# Patient Record
Sex: MALE | Race: WHITE | Employment: PART TIME | URBAN - METROPOLITAN AREA
[De-identification: names, ages, dates, MRNs, and addresses within clinical notes are randomized per-mention and may not be internally consistent; named-entity substitution may affect disease eponyms.]

---

## 2017-03-13 ENCOUNTER — TELEPHONE (OUTPATIENT)
Dept: FAMILY MEDICINE | Facility: CLINIC | Age: 32
End: 2017-03-13

## 2017-03-13 DIAGNOSIS — H10.9 CONJUNCTIVITIS: Primary | ICD-10-CM

## 2017-03-13 RX ORDER — POLYMYXIN B SULFATE AND TRIMETHOPRIM 1; 10000 MG/ML; [USP'U]/ML
1 SOLUTION OPHTHALMIC 4 TIMES DAILY
Qty: 2 ML | Refills: 0 | Status: SHIPPED | OUTPATIENT
Start: 2017-03-13 | End: 2017-03-20

## 2017-03-13 NOTE — TELEPHONE ENCOUNTER
RN Conjunctivitis Protocol: Ages 2 and older  Dominick Ross is a 31 year old male is having symptoms reviewed for possible conjunctivitis.      ASSESSMENT/PLAN:  Allergy to Sulfa?  No   1.  Medication Indicated: YES - POLYTRIM 28741-8.1 UNIT/ML-% OP Sol, 1 drop in affected eye(s) 4 times daily while awake x 7 days. .    2.  Education regarding contact precautions, hand washing, avoid wearing contacts until finished with drops or until symptoms resolve, contact clinic if there is no improvement of symptoms within 3 days and if develops eye pain or sensitivity to light.   3.  Follow-up: Contact provider's triage RN if symptoms do not improve after 3 days of antibiotic treatment or if symptoms return after antibiotic therapy is complete.  4.  Patient verbalized understanding of this plan and is agreeable.    SUBJECTIVE:     Conjunctival symptoms: redness, mattering (yellow/green) and itching   Location: right eye  Onset: 1 day ago  In addition notes: None  Contact Lens use?: Yes; clean or dispose of current contacts, no contact use for 24 hrs from initiation of antibiotic therapy  Complicating factors    Reports:NONE  Denies:Vision changes; not cleared with blinking, Recent  history of eye trauma, Sensitivity to light, Severe eye pain, Fever: none, Eyelid symptoms None      OBJECTIVE:     Encounter handled by: Nurse Triage.     Praveena Owen RN

## 2018-02-28 ENCOUNTER — OFFICE VISIT (OUTPATIENT)
Dept: INTERNAL MEDICINE | Facility: CLINIC | Age: 33
End: 2018-02-28
Payer: COMMERCIAL

## 2018-02-28 VITALS
HEART RATE: 89 BPM | DIASTOLIC BLOOD PRESSURE: 74 MMHG | WEIGHT: 207.1 LBS | OXYGEN SATURATION: 97 % | BODY MASS INDEX: 28.48 KG/M2 | SYSTOLIC BLOOD PRESSURE: 124 MMHG | TEMPERATURE: 97.8 F

## 2018-02-28 DIAGNOSIS — J02.0 ACUTE STREPTOCOCCAL PHARYNGITIS: ICD-10-CM

## 2018-02-28 DIAGNOSIS — R07.0 THROAT PAIN: Primary | ICD-10-CM

## 2018-02-28 LAB
DEPRECATED S PYO AG THROAT QL EIA: ABNORMAL
SPECIMEN SOURCE: ABNORMAL

## 2018-02-28 PROCEDURE — 99213 OFFICE O/P EST LOW 20 MIN: CPT | Performed by: INTERNAL MEDICINE

## 2018-02-28 PROCEDURE — 87880 STREP A ASSAY W/OPTIC: CPT | Performed by: INTERNAL MEDICINE

## 2018-02-28 RX ORDER — PENICILLIN V POTASSIUM 500 MG/1
500 TABLET, FILM COATED ORAL 3 TIMES DAILY
Qty: 30 TABLET | Refills: 0 | Status: SHIPPED | OUTPATIENT
Start: 2018-02-28 | End: 2019-01-16

## 2018-02-28 ASSESSMENT — PAIN SCALES - GENERAL: PAINLEVEL: NO PAIN (0)

## 2018-02-28 NOTE — NURSING NOTE
"Chief Complaint   Patient presents with     Pharyngitis     Monday, last night worse       Initial /74  Pulse 89  Temp 97.8  F (36.6  C) (Tympanic)  Wt 207 lb 1.6 oz (93.9 kg)  SpO2 97%  BMI 28.48 kg/m2 Estimated body mass index is 28.48 kg/(m^2) as calculated from the following:    Height as of 5/7/15: 5' 11.5\" (1.816 m).    Weight as of this encounter: 207 lb 1.6 oz (93.9 kg).  Medication Reconciliation: complete  "

## 2018-02-28 NOTE — PROGRESS NOTES
SUBJECTIVE:   Dominick Ross is a 32 year old male who presents to clinic today for the following health issues:    Acute Illness   Acute illness concerns: sore throat   Onset: Monday     Fever: no    Chills/Sweats: YES    Headache (location?): no    Sinus Pressure:no    Conjunctivitis:  no    Ear Pain: no    Rhinorrhea: no    Congestion: YES    Sore Throat: YES     Cough: YES    Wheeze: no    Decreased Appetite: no    Nausea: no    Vomiting: no     Diarrhea:  no    Dysuria/Freq.: no    Fatigue/Achiness: YES    Sick/Strep Exposure: YES- wife had strep thraot     Therapies Tried and outcome: Tylenol and OTC cold medication  Wife had strep, now he has symptoms.    Was ok until last night.      Past Medical History:   Diagnosis Date     NO ACTIVE PROBLEMS      No current outpatient prescriptions on file.     No current facility-administered medications for this visit.      Social History   Substance Use Topics     Smoking status: Never Smoker     Smokeless tobacco: Current User     Types: Chew     Alcohol use Yes     Physical Exam  /74  Pulse 89  Temp 97.8  F (36.6  C) (Tympanic)  Wt 207 lb 1.6 oz (93.9 kg)  SpO2 97%  BMI 28.48 kg/m2  General Appearance-healthy, alert, no distress  ENT-tonsils red, enlarged, with exudate present  Cardiac-regular rate and rhythm  with normal S1, S2 ; no murmur, rub or gallops  Lungs-clear to auscultation    ASSESSMENT:  Patient with positive strep test, will treat with pcn for 10 days.  Warned about side effects.    Electronically signed by Delvis Alvarado MD

## 2018-02-28 NOTE — MR AVS SNAPSHOT
"              After Visit Summary   2018    Dominick Ross    MRN: [id number]           Patient Information     Date Of Birth          1985        Visit Information        Provider Department      2018 11:30 AM Delvis Alvarado MD [facility]         Follow-ups after your visit        Who to contact     If you have questions or need follow up information about today's clinic visit or your schedule please contact [facility] directly at [phone].  Normal or non-critical lab and imaging results will be communicated to you by [employer], letter or phone within 4 business days after the clinic has received the results. If you do not hear from us within 7 days, please contact the clinic through [employer] or phone. If you have a critical or abnormal lab result, we will notify you by phone as soon as possible.  Submit refill requests through [employer] or call your pharmacy and they will forward the refill request to us. Please allow 3 business days for your refill to be completed.          Additional Information About Your Visit        [employer] Information     [employer] lets you send messages to your doctor, view your test results, renew your prescriptions, schedule appointments and more. To sign up, go to [email or web address] . Click on \"Log in\" on the left side of the screen, which will take you to the Welcome page. Then click on \"Sign up Now\" on the right side of the page.     You will be asked to enter the access code listed below, as well as some personal information. Please follow the directions to create your username and password.     Your access code is: [email or web address]  Expires: 2018 11:33 AM     Your access code will  in 90 days. If you need help or a new code, please call your [facility] or [phone].        Care EveryWhere ID     This is your Care EveryWhere ID. This could be used by other organizations to access your [place] medical " records  [id number]        Your Vitals Were     Pulse Temperature Pulse Oximetry BMI (Body Mass Index)          89 97.8  F (36.6  C) (Tympanic) 97% 28.48 kg/m2         Blood Pressure from Last 3 Encounters:   02/28/18 124/74   09/03/15 121/85   08/06/15 110/68    Weight from Last 3 Encounters:   02/28/18 207 lb 1.6 oz (93.9 kg)   08/06/15 213 lb (96.6 kg)   05/07/15 208 lb 8 oz (94.6 kg)              Today, you had the following     No orders found for display       Primary Care Provider Office Phone # Fax #    Remy Rosas [phone]       [facility] [address] [place] [address]        Equal Access to Services     DANIELLA LIZ : Hadii aad ku hadasho Soomaali, waaxda luqadaha, qaybta kaalmada adeignacioyada, jorge ramírez . So [facility] [phone].    ATENCIÓN: Si habla español, tiene a thompson disposición servicios gratuitos de asistencia lingüística. Anurag al [phone].    We comply with applicable federal civil rights laws and [place] laws. We do not discriminate on the basis of race, color, national origin, age, disability, sex, sexual orientation, or gender identity.            Thank you!     Thank you for choosing [facility]  for your care. Our goal is always to provide you with excellent care. Hearing back from our patients is one way we can continue to improve our services. Please take a few minutes to complete the written survey that you may receive in the mail after your visit with us. Thank you!             Your Updated Medication List - Protect others around you: Learn how to safely use, store and throw away your medicines at www.disposemymeds.org.      Notice  As of 2/28/2018 11:33 AM    You have not been prescribed any medications.

## 2018-12-28 ENCOUNTER — TRANSFERRED RECORDS (OUTPATIENT)
Dept: HEALTH INFORMATION MANAGEMENT | Facility: CLINIC | Age: 33
End: 2018-12-28

## 2019-01-03 ENCOUNTER — TRANSFERRED RECORDS (OUTPATIENT)
Dept: HEALTH INFORMATION MANAGEMENT | Facility: CLINIC | Age: 34
End: 2019-01-03

## 2019-01-08 ENCOUNTER — TRANSFERRED RECORDS (OUTPATIENT)
Dept: HEALTH INFORMATION MANAGEMENT | Facility: CLINIC | Age: 34
End: 2019-01-08

## 2019-01-08 ENCOUNTER — TELEPHONE (OUTPATIENT)
Dept: FAMILY MEDICINE | Facility: CLINIC | Age: 34
End: 2019-01-08

## 2019-01-08 NOTE — TELEPHONE ENCOUNTER
Patient called to schedule an appointment for a hospital follow-up or appeared on a report showing that they were recently discharged from the hospital.    Patient was admitted to [facility]:  01/03/2019  Discharged date: 01/09/2019  Reason for hospital admission:  Spinal cord injury  Does patient have future appointment scheduled with provider? Yes Dr Rosas  Date of future appointment:  01/16/2019      This information will be used to help the care team plan for the patients upcoming visit.  The triage RN may determine that a follow up call is necessary and reach out to the patient via the phone number listed in the chart.     Please route this message on routine priority to the Triage RN pool.

## 2019-01-10 NOTE — TELEPHONE ENCOUNTER
ED / Discharge Outreach Protocol    Patient Contact    Attempt # 1    Was call answered?  No.  Left message on voicemail with information to call me back.    Raysa Back RN

## 2019-01-10 NOTE — TELEPHONE ENCOUNTER
"Hospital/TCU/ED for chronic condition Discharge Protocol    \"Hi, my name is Raysa Back, a registered nurse, and I am calling from [facility].  I am calling to follow up and see how things are going for you after your recent emergency visit/hospital/TCU stay.\"    Tell me how you are doing now that you are home?\" I'm doing ok.      Discharge Instructions    \"Let's review your discharge instructions.  What is/are the follow-up recommendations?  Pt. Response: Activity as tolerated, regular diet, get PT set up, and take my meds. I can't go back to work yet.     \"Has an appointment with your primary care provider been scheduled?\"   Yes. (confirm)    \"When you see the provider, I would recommend that you bring your medications with you.\"    Medications    \"Tell me what changed about your medicines when you discharged?\"    Changes to chronic meds?    2 or more - Epic MTM referral needed      \"What questions do you have about your medications?\"    None     New diagnoses of heart failure, COPD, diabetes, or MI?    No       Medication reconciliation completed? Attempted, but patient unable to complete on phone:   He reports he is now taking Oxycodone, Gabapentin, Tylenol, Ibuprofen, Senacot, and Robaxin  Was MTM referral placed (*Make sure to put transitions as reason for referral)?   No    Call Summary    \"What questions or concerns do you have about your recent visit and your follow-up care?\"     none    \"If you have questions or things don't continue to improve, we encourage you contact us through the main clinic number (give number).  Even if the clinic is not open, triage nurses are available 24/7 to help you.     We would like you to know that our clinic has extended hours (provide information).  We also have urgent care (provide details on closest location and hours/contact info)\"      \"Thank you for your time and take care!\"       Raysa Back RN         "

## 2019-01-14 ENCOUNTER — HOSPITAL ENCOUNTER (OUTPATIENT)
Dept: PHYSICAL THERAPY | Facility: CLINIC | Age: 34
Setting detail: THERAPIES SERIES
End: 2019-01-14
Attending: PSYCHIATRY & NEUROLOGY
Payer: OTHER MISCELLANEOUS

## 2019-01-14 PROCEDURE — 97162 PT EVAL MOD COMPLEX 30 MIN: CPT | Mod: GP | Performed by: PHYSICAL THERAPIST

## 2019-01-14 PROCEDURE — 97530 THERAPEUTIC ACTIVITIES: CPT | Mod: GP | Performed by: PHYSICAL THERAPIST
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
## 2019-01-16 ENCOUNTER — OFFICE VISIT (OUTPATIENT)
Dept: BEHAVIORAL HEALTH | Facility: CLINIC | Age: 34
End: 2019-01-16
Payer: COMMERCIAL

## 2019-01-16 ENCOUNTER — OFFICE VISIT (OUTPATIENT)
Dept: FAMILY MEDICINE | Facility: CLINIC | Age: 34
End: 2019-01-16
Payer: OTHER MISCELLANEOUS

## 2019-01-16 ENCOUNTER — HOSPITAL ENCOUNTER (OUTPATIENT)
Dept: PHYSICAL THERAPY | Facility: CLINIC | Age: 34
Setting detail: THERAPIES SERIES
End: 2019-01-16
Attending: PSYCHIATRY & NEUROLOGY
Payer: OTHER MISCELLANEOUS

## 2019-01-16 VITALS
SYSTOLIC BLOOD PRESSURE: 122 MMHG | RESPIRATION RATE: 16 BRPM | BODY MASS INDEX: 27.66 KG/M2 | TEMPERATURE: 98.1 F | OXYGEN SATURATION: 96 % | HEART RATE: 91 BPM | WEIGHT: 201.1 LBS | DIASTOLIC BLOOD PRESSURE: 58 MMHG

## 2019-01-16 DIAGNOSIS — S22.31XD CLOSED FRACTURE OF ONE RIB OF RIGHT SIDE WITH ROUTINE HEALING, SUBSEQUENT ENCOUNTER: ICD-10-CM

## 2019-01-16 DIAGNOSIS — F43.22 ADJUSTMENT DISORDER WITH ANXIOUS MOOD: Primary | ICD-10-CM

## 2019-01-16 DIAGNOSIS — F43.10 PTSD (POST-TRAUMATIC STRESS DISORDER): ICD-10-CM

## 2019-01-16 DIAGNOSIS — S22.008D CLOSED FRACTURE OF SPINOUS PROCESS OF THORACIC VERTEBRA WITH ROUTINE HEALING, SUBSEQUENT ENCOUNTER: ICD-10-CM

## 2019-01-16 DIAGNOSIS — S22.080S: Primary | ICD-10-CM

## 2019-01-16 PROCEDURE — 99215 OFFICE O/P EST HI 40 MIN: CPT | Performed by: FAMILY MEDICINE

## 2019-01-16 PROCEDURE — 97110 THERAPEUTIC EXERCISES: CPT | Mod: GP | Performed by: PHYSICAL THERAPIST

## 2019-01-16 PROCEDURE — 97112 NEUROMUSCULAR REEDUCATION: CPT | Mod: GP | Performed by: PHYSICAL THERAPIST

## 2019-01-16 RX ORDER — GABAPENTIN 300 MG/1
300 CAPSULE ORAL 2 TIMES DAILY
COMMUNITY
End: 2019-02-11

## 2019-01-16 RX ORDER — TRAZODONE HYDROCHLORIDE 50 MG/1
50 TABLET, FILM COATED ORAL AT BEDTIME
COMMUNITY

## 2019-01-16 RX ORDER — METHOCARBAMOL 500 MG/1
500 TABLET, FILM COATED ORAL 4 TIMES DAILY
COMMUNITY

## 2019-01-16 RX ORDER — ACETAMINOPHEN 500 MG
500 TABLET ORAL
COMMUNITY

## 2019-01-16 RX ORDER — OXYCODONE HCL 5 MG/5 ML
5 SOLUTION, ORAL ORAL EVERY 4 HOURS PRN
COMMUNITY

## 2019-01-16 RX ORDER — POLYETHYLENE GLYCOL 3350 17 G/17G
1 POWDER, FOR SOLUTION ORAL DAILY
COMMUNITY

## 2019-01-16 RX ORDER — AMOXICILLIN 250 MG
1-2 CAPSULE ORAL 2 TIMES DAILY PRN
COMMUNITY

## 2019-01-16 RX ORDER — IBUPROFEN 400 MG/1
400 TABLET, FILM COATED ORAL EVERY 4 HOURS PRN
COMMUNITY
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
## 2019-01-18 NOTE — ADDENDUM NOTE
Encounter addended by: Millie Manriquez, PT on: 1/18/2019 9:50 AM   Actions taken: Flowsheet accepted

## 2019-01-20 PROBLEM — S22.31XD CLOSED FRACTURE OF ONE RIB OF RIGHT SIDE WITH ROUTINE HEALING, SUBSEQUENT ENCOUNTER: Status: ACTIVE | Noted: 2019-01-20

## 2019-01-20 PROBLEM — S22.008D: Status: ACTIVE | Noted: 2019-01-20

## 2019-01-20 PROBLEM — S22.080S: Status: ACTIVE | Noted: 2019-01-20

## 2019-01-20 PROBLEM — F43.10 PTSD (POST-TRAUMATIC STRESS DISORDER): Status: ACTIVE | Noted: 2019-01-20

## 2019-01-22 ENCOUNTER — OFFICE VISIT (OUTPATIENT)
Dept: BEHAVIORAL HEALTH | Facility: CLINIC | Age: 34
End: 2019-01-22
Payer: OTHER MISCELLANEOUS

## 2019-01-22 DIAGNOSIS — F43.23 ADJUSTMENT DISORDER WITH MIXED ANXIETY AND DEPRESSED MOOD: Primary | ICD-10-CM

## 2019-01-22 PROCEDURE — 90791 PSYCH DIAGNOSTIC EVALUATION: CPT | Performed by: MARRIAGE & FAMILY THERAPIST

## 2019-01-22 ASSESSMENT — ANXIETY QUESTIONNAIRES
IF YOU CHECKED OFF ANY PROBLEMS ON THIS QUESTIONNAIRE, HOW DIFFICULT HAVE THESE PROBLEMS MADE IT FOR YOU TO DO YOUR WORK, TAKE CARE OF THINGS AT HOME, OR GET ALONG WITH OTHER PEOPLE: VERY DIFFICULT
7. FEELING AFRAID AS IF SOMETHING AWFUL MIGHT HAPPEN: NEARLY EVERY DAY
1. FEELING NERVOUS, ANXIOUS, OR ON EDGE: SEVERAL DAYS
GAD7 TOTAL SCORE: 11
3. WORRYING TOO MUCH ABOUT DIFFERENT THINGS: MORE THAN HALF THE DAYS
5. BEING SO RESTLESS THAT IT IS HARD TO SIT STILL: SEVERAL DAYS
2. NOT BEING ABLE TO STOP OR CONTROL WORRYING: SEVERAL DAYS
6. BECOMING EASILY ANNOYED OR IRRITABLE: MORE THAN HALF THE DAYS

## 2019-01-22 ASSESSMENT — PATIENT HEALTH QUESTIONNAIRE - PHQ9
5. POOR APPETITE OR OVEREATING: SEVERAL DAYS
SUM OF ALL RESPONSES TO PHQ QUESTIONS 1-9: 5
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
## 2019-01-23 ENCOUNTER — HOSPITAL ENCOUNTER (OUTPATIENT)
Dept: PHYSICAL THERAPY | Facility: CLINIC | Age: 34
Setting detail: THERAPIES SERIES
End: 2019-01-23
Attending: PSYCHIATRY & NEUROLOGY
Payer: OTHER MISCELLANEOUS

## 2019-01-23 PROCEDURE — 97110 THERAPEUTIC EXERCISES: CPT | Mod: GP | Performed by: PHYSICAL THERAPIST

## 2019-01-23 PROCEDURE — 97112 NEUROMUSCULAR REEDUCATION: CPT | Mod: GP | Performed by: PHYSICAL THERAPIST

## 2019-01-23 ASSESSMENT — ANXIETY QUESTIONNAIRES: GAD7 TOTAL SCORE: 11

## 2019-01-24 ENCOUNTER — HOSPITAL ENCOUNTER (OUTPATIENT)
Dept: PHYSICAL THERAPY | Facility: CLINIC | Age: 34
Setting detail: THERAPIES SERIES
End: 2019-01-24
Attending: PSYCHIATRY & NEUROLOGY
Payer: OTHER MISCELLANEOUS

## 2019-01-24 PROCEDURE — 97110 THERAPEUTIC EXERCISES: CPT | Mod: GP | Performed by: PHYSICAL THERAPIST

## 2019-01-24 PROCEDURE — 97112 NEUROMUSCULAR REEDUCATION: CPT | Mod: GP | Performed by: PHYSICAL THERAPIST

## 2019-01-28 ENCOUNTER — HOSPITAL ENCOUNTER (OUTPATIENT)
Dept: PHYSICAL THERAPY | Facility: CLINIC | Age: 34
Setting detail: THERAPIES SERIES
End: 2019-01-28
Attending: PSYCHIATRY & NEUROLOGY
Payer: OTHER MISCELLANEOUS

## 2019-01-28 PROCEDURE — 97110 THERAPEUTIC EXERCISES: CPT | Mod: GP | Performed by: PHYSICAL THERAPIST

## 2019-01-28 PROCEDURE — 97112 NEUROMUSCULAR REEDUCATION: CPT | Mod: GP | Performed by: PHYSICAL THERAPIST

## 2019-01-30 ENCOUNTER — HOSPITAL ENCOUNTER (OUTPATIENT)
Dept: PHYSICAL THERAPY | Facility: CLINIC | Age: 34
Setting detail: THERAPIES SERIES
End: 2019-01-30
Attending: PSYCHIATRY & NEUROLOGY
Payer: OTHER MISCELLANEOUS

## 2019-01-30 PROCEDURE — 97112 NEUROMUSCULAR REEDUCATION: CPT | Mod: GP | Performed by: PHYSICAL THERAPIST

## 2019-01-30 PROCEDURE — 97110 THERAPEUTIC EXERCISES: CPT | Mod: GP | Performed by: PHYSICAL THERAPIST

## 2019-01-31 ENCOUNTER — OFFICE VISIT (OUTPATIENT)
Dept: FAMILY MEDICINE | Facility: CLINIC | Age: 34
End: 2019-01-31
Payer: OTHER MISCELLANEOUS

## 2019-01-31 ENCOUNTER — HOSPITAL ENCOUNTER (OUTPATIENT)
Dept: CARDIOLOGY | Facility: CLINIC | Age: 34
Discharge: HOME | End: 2019-01-31
Attending: FAMILY MEDICINE | Admitting: FAMILY MEDICINE
Payer: COMMERCIAL

## 2019-01-31 VITALS
OXYGEN SATURATION: 97 % | WEIGHT: 206.6 LBS | RESPIRATION RATE: 12 BRPM | BODY MASS INDEX: 28.41 KG/M2 | SYSTOLIC BLOOD PRESSURE: 106 MMHG | TEMPERATURE: 97.6 F | HEART RATE: 90 BPM | DIASTOLIC BLOOD PRESSURE: 58 MMHG

## 2019-01-31 DIAGNOSIS — R00.2 PALPITATIONS: Primary | ICD-10-CM

## 2019-01-31 DIAGNOSIS — S22.080S: ICD-10-CM

## 2019-01-31 DIAGNOSIS — R00.2 PALPITATIONS: ICD-10-CM

## 2019-01-31 PROCEDURE — 93000 ELECTROCARDIOGRAM COMPLETE: CPT | Performed by: FAMILY MEDICINE

## 2019-01-31 PROCEDURE — 93270 REMOTE 30 DAY ECG REV/REPORT: CPT

## 2019-01-31 PROCEDURE — 93272 ECG/REVIEW INTERPRET ONLY: CPT | Performed by: INTERNAL MEDICINE

## 2019-01-31 PROCEDURE — 99214 OFFICE O/P EST MOD 30 MIN: CPT | Performed by: FAMILY MEDICINE
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
## 2019-02-04 ENCOUNTER — HOSPITAL ENCOUNTER (OUTPATIENT)
Dept: PHYSICAL THERAPY | Facility: CLINIC | Age: 34
Setting detail: THERAPIES SERIES
End: 2019-02-04
Attending: PSYCHIATRY & NEUROLOGY
Payer: OTHER MISCELLANEOUS

## 2019-02-04 ENCOUNTER — OFFICE VISIT (OUTPATIENT)
Dept: BEHAVIORAL HEALTH | Facility: CLINIC | Age: 34
End: 2019-02-04
Payer: OTHER MISCELLANEOUS

## 2019-02-04 DIAGNOSIS — F43.23 ADJUSTMENT DISORDER WITH MIXED ANXIETY AND DEPRESSED MOOD: Primary | ICD-10-CM

## 2019-02-04 PROCEDURE — 97110 THERAPEUTIC EXERCISES: CPT | Mod: GP | Performed by: PHYSICAL THERAPIST

## 2019-02-04 PROCEDURE — 90834 PSYTX W PT 45 MINUTES: CPT | Performed by: MARRIAGE & FAMILY THERAPIST
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
## 2019-02-06 ENCOUNTER — TRANSFERRED RECORDS (OUTPATIENT)
Dept: HEALTH INFORMATION MANAGEMENT | Facility: CLINIC | Age: 34
End: 2019-02-06

## 2019-02-08 ENCOUNTER — HOSPITAL ENCOUNTER (OUTPATIENT)
Dept: PHYSICAL THERAPY | Facility: CLINIC | Age: 34
Setting detail: THERAPIES SERIES
End: 2019-02-08
Attending: PSYCHIATRY & NEUROLOGY
Payer: OTHER MISCELLANEOUS

## 2019-02-08 DIAGNOSIS — S22.080S: ICD-10-CM

## 2019-02-08 DIAGNOSIS — S22.31XD CLOSED FRACTURE OF ONE RIB OF RIGHT SIDE WITH ROUTINE HEALING, SUBSEQUENT ENCOUNTER: Primary | ICD-10-CM

## 2019-02-08 PROCEDURE — 97110 THERAPEUTIC EXERCISES: CPT | Mod: GP | Performed by: PHYSICAL THERAPIST

## 2019-02-08 PROCEDURE — 97112 NEUROMUSCULAR REEDUCATION: CPT | Mod: GP | Performed by: PHYSICAL THERAPIST
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
## 2019-02-11 ENCOUNTER — HOSPITAL ENCOUNTER (OUTPATIENT)
Dept: PHYSICAL THERAPY | Facility: CLINIC | Age: 34
Setting detail: THERAPIES SERIES
End: 2019-02-11
Attending: PSYCHIATRY & NEUROLOGY
Payer: OTHER MISCELLANEOUS

## 2019-02-11 PROCEDURE — 97110 THERAPEUTIC EXERCISES: CPT | Mod: GP | Performed by: PHYSICAL THERAPIST
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
## 2019-02-13 ENCOUNTER — HOSPITAL ENCOUNTER (OUTPATIENT)
Dept: PHYSICAL THERAPY | Facility: CLINIC | Age: 34
Setting detail: THERAPIES SERIES
End: 2019-02-13
Attending: PSYCHIATRY & NEUROLOGY
Payer: OTHER MISCELLANEOUS

## 2019-02-13 PROCEDURE — 97112 NEUROMUSCULAR REEDUCATION: CPT | Mod: GP | Performed by: PHYSICAL THERAPIST

## 2019-02-13 PROCEDURE — 97110 THERAPEUTIC EXERCISES: CPT | Mod: GP | Performed by: PHYSICAL THERAPIST

## 2019-02-13 PROCEDURE — 97116 GAIT TRAINING THERAPY: CPT | Mod: GP | Performed by: PHYSICAL THERAPIST

## 2019-02-13 RX ORDER — GABAPENTIN 300 MG/1
300 CAPSULE ORAL 2 TIMES DAILY
Qty: 60 CAPSULE | Refills: 1 | Status: SHIPPED | OUTPATIENT
Start: 2019-02-13

## 2019-02-14 ENCOUNTER — OFFICE VISIT (OUTPATIENT)
Dept: BEHAVIORAL HEALTH | Facility: CLINIC | Age: 34
End: 2019-02-14
Payer: OTHER MISCELLANEOUS

## 2019-02-14 DIAGNOSIS — F43.23 ADJUSTMENT DISORDER WITH MIXED ANXIETY AND DEPRESSED MOOD: Primary | ICD-10-CM

## 2019-02-14 PROCEDURE — 90832 PSYTX W PT 30 MINUTES: CPT | Performed by: MARRIAGE & FAMILY THERAPIST
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
## 2019-02-18 ENCOUNTER — OFFICE VISIT (OUTPATIENT)
Dept: BEHAVIORAL HEALTH | Facility: CLINIC | Age: 34
End: 2019-02-18
Payer: OTHER MISCELLANEOUS

## 2019-02-18 ENCOUNTER — TRANSFERRED RECORDS (OUTPATIENT)
Dept: HEALTH INFORMATION MANAGEMENT | Facility: CLINIC | Age: 34
End: 2019-02-18

## 2019-02-18 DIAGNOSIS — F43.23 ADJUSTMENT DISORDER WITH MIXED ANXIETY AND DEPRESSED MOOD: Primary | ICD-10-CM

## 2019-02-18 PROCEDURE — 90832 PSYTX W PT 30 MINUTES: CPT | Performed by: MARRIAGE & FAMILY THERAPIST

## 2019-02-18 NOTE — RESULT ENCOUNTER NOTE
Cardiac monitor just showed sinus rhythm.  On one event when it was marked as feeling dizzy it was just a sinus tachycardia of 115.

## 2019-02-18 NOTE — PROGRESS NOTES
[facility]  February 18, 2019      Behavioral Health Clinician Progress Note    Patient Name: Dominick Ross           Service Type:  Individual      Service Location:   Face to Face in Clinic     Session Start Time: 9:40  Session End Time: 10:05      Session Length: 16 - 37      Attendees: Patient    Visit Activities (Refresh list every visit): [facility] Only    Diagnostic Assessment Date: 1/22/19  Treatment Plan Review Date: 2/14/2019  See Flowsheets for today's PHQ-9 and CANDY-7 results  Previous PHQ-9:   PHQ-9 SCORE 1/22/2019   PHQ-9 Total Score 5     Previous CANDY-7:   CANDY-7 SCORE 1/22/2019   Total Score 11       YENI LEVEL:  No flowsheet data found.    DATA  Extended Session (60+ minutes): No  Interactive Complexity: No  Crisis: No  [facility] Patient: No    Treatment Objective(s) Addressed in This Session:  Target Behavior(s): disease management/lifestyle changes anxiety and depression related to work accident and resulting medical issues    Depressed Mood: Decrease frequency and intensity of feeling down, depressed, hopeless  Feel less tired and more energy during the day   Identify negative self-talk and behaviors: challenge core beliefs, myths, and actions  Improve concentration, focus, and mindfulness in daily activities   Anxiety: will experience a reduction in anxiety, will develop more effective coping skills to manage anxiety symptoms, will develop healthy cognitive patterns and beliefs and will increase ability to function adaptively  Adjustment Difficulties: will develop coping/problem-solving skills to facilitate more adaptive adjustment  Relationship Problems: will address relationship difficulties in a more adaptive manner    Current Stressors / Issues:  Patient reports that he has been doing pretty well. He denies having any episodes where he feels difficulty taking a deep breath. He has been taking time to relax and breathe.   Patient has been talking with his spouse about  "possibly shadowing his dad for work to see what his dad's job is like. Patient wonders what it will be like to return to work as he has difficulty seeing any \"light\" duties in his role.   Patient processed how his accident has shifted his view in life and how he prioritizes things.      Progress on Treatment Objective(s) / Homework:  Minimal progress - ACTION (Actively working towards change); Intervened by reinforcing change plan / affirming steps taken    Motivational Interviewing    MI Intervention: Expressed Empathy/Understanding, Supported Autonomy, Collaboration, Evocation, Permission to raise concern or advise, Open-ended questions, Reflections: simple and complex, Change talk (evoked) and Reframe     Change Talk Expressed by the Patient: Desire to change Ability to change Reasons to change Need to change Committment to change Activation Taking steps    Provider Response to Change Talk: E - Evoked more info from patient about behavior change, A - Affirmed patient's thoughts, decisions, or attempts at behavior change, R - Reflected patient's change talk and S - Summarized patient's change talk statements    Also provided psychoeducation about behavioral health condition, symptoms, and treatment options    Skills training    Explored skills useful to client in current situation    Skills include assertiveness, communication, conflict management, problem-solving, relaxation, etc.    Solution-Focused Therapy    Explored patterns in patient's relationships and discussed options for new behaviors    Explored patterns in patient's actions and choices and discussed options for new behaviors    Cognitive-behavioral Therapy    Discussed common cognitive distortions, identified them in patient's life    Explored ways to challenge, replace, and act against these cognitions    Acceptance and Commitment Therapy    Explored and identified important values in patient's life    Discussed ways to commit to behavioral " activation around these values    Psychodynamic psychotherapy    Discussed patient's emotional dynamics and issues and how they impact behaviors    Explored patient's history of relationships and how they impact present behaviors    Explored how to work with and make changes in these schemas and patterns    Behavioral Activation    Discussed steps patient can take to become more involved in meaningful activity    Identified barriers to these activities and explored possible solutions    Mindfulness-Based Strategies    Discussed skills based on development and application of mindfulness    Skills drawn from dialectical behavior therapy, mindfulness-based stress reduction, mindfulness-based cognitive therapy, etc.      Care Plan review completed: Yes    Medication Review:  No current psychiatric medications prescribed    Medication Compliance:  NA    Changes in Health Issues:   Yes: Pain, some associated psychological distress as he states that this was different from before    Chemical Use Review:   Substance Use: Chemical use reviewed, no active concerns identified      Tobacco Use: No current tobacco use.      Assessment: Current Emotional / Mental Status (status of significant symptoms):  Risk status (Self / Other harm or suicidal ideation)  Patient has had a history of suicidal ideation: about a year ago he states that he had thoughts where he wondered if he be better off not around, he denied having any plan or intention and this happened when there was increased conflict with spouse  and denies a history of suicide attempts, self-injurious behavior, homicidal ideation, homicidal behavior and and other safety concerns  Patient denies current fears or concerns for personal safety.  Patient denies current or recent suicidal ideation or behaviors.  Patient denies current or recent homicidal ideation or behaviors.  Patient denies current or recent self injurious behavior or ideation.  Patient denies other safety  concerns.  A safety and risk management plan has not been developed at this time, however patient was encouraged to call [place] [phone] should there be a change in any of these risk factors.    Appearance:   Appropriate   Eye Contact:   Good   Psychomotor Behavior: Normal   Attitude:   Cooperative   Orientation:   All  Speech   Rate / Production: Normal    Volume:  Normal   Mood:    Normal  Affect:    Appropriate   Thought Content:  Clear   Thought Form:  Coherent  Logical   Insight:    Good     Diagnoses:  1. Adjustment disorder with mixed anxiety and depressed mood        Collateral Reports Completed:  Not Applicable    Plan: (Homework, other):  Patient was given information about behavioral services and encouraged to schedule a follow up appointment with the clinic [facility] in 2 weeks.  He was also given information about mental health symptoms and treatment options , Cognitive Behavioral Therapy skills to practice when experiencing anxiety and depression and deep Breathing Strategies to practice when experiencing anxiety and depression.  CD Recommendations: No indications of CD issues. Patient will monitor pain and triggers for increased anxiety. He will use coping statements and deep breathing to help him manage symptoms. GEMA Gomez, [facility]        ______________________________________________________________________    Integrated Primary Care Behavioral Health Treatment Plan    Patient's Name: Dominick Ross  YOB: 1985    Date: February 14, 2019    DSM-V Diagnoses: Adjustment Disorders  309.28 (F43.23) With mixed anxiety and depressed mood   Rule out PTSD  Psychosocial / Contextual Factors: work accident/medical injury  WHODAS: 32    Referral / Collaboration:  Referral to another professional/service is not indicated at this time..    Anticipated number of session or this episode of care: 8      MeasurableTreatment Goal(s) related to diagnosis / functional impairment(s)  Goal 1:  Patient will effectively reduce anxiety symptoms as evidenced by a reduced PHQ9 & GAD7 scores of 5 or less with the occurrence of several days or less as it relates to his medical issues and work accident.      Objective #A (Patient Action)    Patient will talk about and process his experience of his work accident and changes afterwards  Status: New - Date: 2/14/19     Intervention(s)  [facility] will facilitate conversations about patient's experience of his work accident and healing process. .    Objective #B  Patient will increase coping strategies by 4  to more effectively manage current stressors  Status: New - Date: 2/14/19     Intervention(s)  [facility] will teach relaxation and mindfulness skills. Help patient identify coping statements and encourage communication with spouse.    Patient has reviewed and agreed to the above plan.    Written by  GEMA Gomez, [facility]

## 2019-02-19 ENCOUNTER — HOSPITAL ENCOUNTER (OUTPATIENT)
Dept: PHYSICAL THERAPY | Facility: CLINIC | Age: 34
Setting detail: THERAPIES SERIES
End: 2019-02-19
Attending: PSYCHIATRY & NEUROLOGY
Payer: OTHER MISCELLANEOUS

## 2019-02-19 PROCEDURE — 97110 THERAPEUTIC EXERCISES: CPT | Mod: GP | Performed by: PHYSICAL THERAPIST

## 2019-02-25 ENCOUNTER — HOSPITAL ENCOUNTER (OUTPATIENT)
Dept: PHYSICAL THERAPY | Facility: CLINIC | Age: 34
Setting detail: THERAPIES SERIES
End: 2019-02-25
Attending: PSYCHIATRY & NEUROLOGY
Payer: OTHER MISCELLANEOUS

## 2019-02-25 PROCEDURE — 97110 THERAPEUTIC EXERCISES: CPT | Mod: GP | Performed by: PHYSICAL THERAPIST

## 2019-02-25 PROCEDURE — 97530 THERAPEUTIC ACTIVITIES: CPT | Mod: GP | Performed by: PHYSICAL THERAPIST

## 2019-02-27 ENCOUNTER — OFFICE VISIT (OUTPATIENT)
Dept: BEHAVIORAL HEALTH | Facility: CLINIC | Age: 34
End: 2019-02-27
Payer: OTHER MISCELLANEOUS

## 2019-02-27 DIAGNOSIS — F43.23 ADJUSTMENT DISORDER WITH MIXED ANXIETY AND DEPRESSED MOOD: Primary | ICD-10-CM

## 2019-02-27 PROCEDURE — 90832 PSYTX W PT 30 MINUTES: CPT | Performed by: MARRIAGE & FAMILY THERAPIST

## 2019-02-27 NOTE — PROGRESS NOTES
[facility]  February 27, 2019      Behavioral Health Clinician Progress Note    Patient Name: Dominick Ross           Service Type:  Individual      Service Location:   Face to Face in Clinic     Session Start Time: 9:38  Session End Time: 10:06      Session Length: 16 - 37      Attendees: Patient, Patient's work comp representative joined for the last five minutes    Visit Activities (Refresh list every visit): [facility] Only    Diagnostic Assessment Date: 1/22/19  Treatment Plan Review Date: 2/14/2019  See Flowsheets for today's PHQ-9 and CANDY-7 results  Previous PHQ-9:   PHQ-9 SCORE 1/22/2019   PHQ-9 Total Score 5     Previous CANDY-7:   CANDY-7 SCORE 1/22/2019   Total Score 11       YENI LEVEL:  No flowsheet data found.    DATA  Extended Session (60+ minutes): No  Interactive Complexity: No  Crisis: No  [facility] Patient: No    Treatment Objective(s) Addressed in This Session:  Target Behavior(s): disease management/lifestyle changes anxiety and depression related to work accident and resulting medical issues    Depressed Mood: Decrease frequency and intensity of feeling down, depressed, hopeless  Feel less tired and more energy during the day   Identify negative self-talk and behaviors: challenge core beliefs, myths, and actions  Improve concentration, focus, and mindfulness in daily activities   Anxiety: will experience a reduction in anxiety, will develop more effective coping skills to manage anxiety symptoms, will develop healthy cognitive patterns and beliefs and will increase ability to function adaptively  Adjustment Difficulties: will develop coping/problem-solving skills to facilitate more adaptive adjustment  Relationship Problems: will address relationship difficulties in a more adaptive manner    Current Stressors / Issues:  Patient denies having any flashbacks. He finds himself thinking about the incident every now and then. He states that his wife and kids will trigger thoughts  of the accident. He recalls that his last thought before he passed out was of them and hoping that they knew he loved them. He experiences feelings of guilt for his son celebrating his 10th birthday at the hospital. Validated patient's feelings. Processed emotions and reframed that his son was likely grateful he was able to celebrate his birthday. Patient states that he feels able to relate to his son as he recalls that his dad had a brain tumor when he was 9 and dad wore an eye patch. Suggested patient talk with his son about this.    Discussed plans and next steps. Patient states that overall he feels he is doing well. He has been stable with symptoms and feels able to cope. Discussed symptoms to be aware of when it would be appropriate to come in to counseling. Patient will come in on an as needed basis.      Progress on Treatment Objective(s) / Homework:  Satisfactory progress - ACTION (Actively working towards change); Intervened by reinforcing change plan / affirming steps taken    Motivational Interviewing    MI Intervention: Expressed Empathy/Understanding, Supported Autonomy, Collaboration, Evocation, Permission to raise concern or advise, Open-ended questions, Reflections: simple and complex, Change talk (evoked) and Reframe     Change Talk Expressed by the Patient: Desire to change Ability to change Reasons to change Need to change Committment to change Activation Taking steps    Provider Response to Change Talk: E - Evoked more info from patient about behavior change, A - Affirmed patient's thoughts, decisions, or attempts at behavior change, R - Reflected patient's change talk and S - Summarized patient's change talk statements    Also provided psychoeducation about behavioral health condition, symptoms, and treatment options    Skills training    Explored skills useful to client in current situation    Skills include assertiveness, communication, conflict management, problem-solving, relaxation,  etc.    Solution-Focused Therapy    Explored patterns in patient's relationships and discussed options for new behaviors    Explored patterns in patient's actions and choices and discussed options for new behaviors    Cognitive-behavioral Therapy    Discussed common cognitive distortions, identified them in patient's life    Explored ways to challenge, replace, and act against these cognitions    Acceptance and Commitment Therapy    Explored and identified important values in patient's life    Discussed ways to commit to behavioral activation around these values    Psychodynamic psychotherapy    Discussed patient's emotional dynamics and issues and how they impact behaviors    Explored patient's history of relationships and how they impact present behaviors    Explored how to work with and make changes in these schemas and patterns    Behavioral Activation    Discussed steps patient can take to become more involved in meaningful activity    Identified barriers to these activities and explored possible solutions    Mindfulness-Based Strategies    Discussed skills based on development and application of mindfulness    Skills drawn from dialectical behavior therapy, mindfulness-based stress reduction, mindfulness-based cognitive therapy, etc.      Care Plan review completed: Yes    Medication Review:  No current psychiatric medications prescribed    Medication Compliance:  NA    Changes in Health Issues:   Yes: Pain, some associated psychological distress as he states that this was different from before    Chemical Use Review:   Substance Use: Chemical use reviewed, no active concerns identified      Tobacco Use: No current tobacco use.      Assessment: Current Emotional / Mental Status (status of significant symptoms):  Risk status (Self / Other harm or suicidal ideation)  Patient has had a history of suicidal ideation: about a year ago he states that he had thoughts where he wondered if he be better off not around,  he denied having any plan or intention and this happened when there was increased conflict with spouse  and denies a history of suicide attempts, self-injurious behavior, homicidal ideation, homicidal behavior and and other safety concerns  Patient denies current fears or concerns for personal safety.  Patient denies current or recent suicidal ideation or behaviors.  Patient denies current or recent homicidal ideation or behaviors.  Patient denies current or recent self injurious behavior or ideation.  Patient denies other safety concerns.  A safety and risk management plan has not been developed at this time, however patient was encouraged to call [place] [phone] should there be a change in any of these risk factors.    Appearance:   Appropriate   Eye Contact:   Good   Psychomotor Behavior: Normal   Attitude:   Cooperative   Orientation:   All  Speech   Rate / Production: Normal    Volume:  Normal   Mood:    Normal  Affect:    Appropriate   Thought Content:  Clear   Thought Form:  Coherent  Logical   Insight:    Good     Diagnoses:  1. Adjustment disorder with mixed anxiety and depressed mood        Collateral Reports Completed:  Not Applicable    Plan: (Homework, other):  Patient was given information about behavioral services and encouraged to schedule a follow up appointment with the clinic [facility] as needed.  He was also given information about mental health symptoms and treatment options , Cognitive Behavioral Therapy skills to practice when experiencing anxiety and depression and deep Breathing Strategies to practice when experiencing anxiety and depression.  CD Recommendations: No indications of CD issues. Patient will monitor pain and triggers for increased anxiety. He will use coping statements and deep breathing to help him manage symptoms. GEMA Gomez, [facility]        ______________________________________________________________________    Integrated Primary Care Behavioral Health Treatment  Plan    Patient's Name: Dominick Ross  YOB: 1985    Date: February 14, 2019    DSM-V Diagnoses: Adjustment Disorders  309.28 (F43.23) With mixed anxiety and depressed mood   Rule out PTSD  Psychosocial / Contextual Factors: work accident/medical injury  WHODAS: 32    Referral / Collaboration:  Referral to another professional/service is not indicated at this time..    Anticipated number of session or this episode of care: 8      MeasurableTreatment Goal(s) related to diagnosis / functional impairment(s)  Goal 1: Patient will effectively reduce anxiety symptoms as evidenced by a reduced PHQ9 & GAD7 scores of 5 or less with the occurrence of several days or less as it relates to his medical issues and work accident.      Objective #A (Patient Action)    Patient will talk about and process his experience of his work accident and changes afterwards  Status: New - Date: 2/14/19     Intervention(s)  [facility] will facilitate conversations about patient's experience of his work accident and healing process. .    Objective #B  Patient will increase coping strategies by 4  to more effectively manage current stressors  Status: New - Date: 2/14/19     Intervention(s)  [facility] will teach relaxation and mindfulness skills. Help patient identify coping statements and encourage communication with spouse.    Patient has reviewed and agreed to the above plan.    Written by  GEMA Gomez, [facility]

## 2019-03-01 ENCOUNTER — HOSPITAL ENCOUNTER (OUTPATIENT)
Dept: PHYSICAL THERAPY | Facility: CLINIC | Age: 34
Setting detail: THERAPIES SERIES
End: 2019-03-01
Attending: PSYCHIATRY & NEUROLOGY
Payer: OTHER MISCELLANEOUS

## 2019-03-01 PROCEDURE — 97112 NEUROMUSCULAR REEDUCATION: CPT | Mod: GP | Performed by: PHYSICAL THERAPIST

## 2019-03-01 PROCEDURE — 97110 THERAPEUTIC EXERCISES: CPT | Mod: GP | Performed by: PHYSICAL THERAPIST

## 2019-03-01 PROCEDURE — 97116 GAIT TRAINING THERAPY: CPT | Mod: GP | Performed by: PHYSICAL THERAPIST

## 2019-03-04 ENCOUNTER — HOSPITAL ENCOUNTER (OUTPATIENT)
Dept: PHYSICAL THERAPY | Facility: CLINIC | Age: 34
Setting detail: THERAPIES SERIES
End: 2019-03-04
Attending: PSYCHIATRY & NEUROLOGY
Payer: OTHER MISCELLANEOUS

## 2019-03-04 PROCEDURE — 97112 NEUROMUSCULAR REEDUCATION: CPT | Mod: GP | Performed by: PHYSICAL THERAPIST

## 2019-03-04 PROCEDURE — 97110 THERAPEUTIC EXERCISES: CPT | Mod: GP | Performed by: PHYSICAL THERAPIST

## 2019-03-08 ENCOUNTER — HOSPITAL ENCOUNTER (OUTPATIENT)
Dept: PHYSICAL THERAPY | Facility: CLINIC | Age: 34
Setting detail: THERAPIES SERIES
End: 2019-03-08
Attending: PSYCHIATRY & NEUROLOGY
Payer: OTHER MISCELLANEOUS

## 2019-03-08 PROCEDURE — 97110 THERAPEUTIC EXERCISES: CPT | Mod: GP | Performed by: PHYSICAL THERAPIST

## 2019-03-08 PROCEDURE — 97112 NEUROMUSCULAR REEDUCATION: CPT | Mod: GP | Performed by: PHYSICAL THERAPIST

## 2019-03-08 PROCEDURE — 97530 THERAPEUTIC ACTIVITIES: CPT | Mod: GP | Performed by: PHYSICAL THERAPIST

## 2019-03-11 ENCOUNTER — HOSPITAL ENCOUNTER (OUTPATIENT)
Dept: PHYSICAL THERAPY | Facility: CLINIC | Age: 34
Setting detail: THERAPIES SERIES
End: 2019-03-11
Attending: PSYCHIATRY & NEUROLOGY
Payer: OTHER MISCELLANEOUS

## 2019-03-11 PROCEDURE — 97116 GAIT TRAINING THERAPY: CPT | Mod: GP | Performed by: PHYSICAL THERAPIST

## 2019-03-11 PROCEDURE — 97110 THERAPEUTIC EXERCISES: CPT | Mod: GP | Performed by: PHYSICAL THERAPIST

## 2019-03-11 PROCEDURE — 97112 NEUROMUSCULAR REEDUCATION: CPT | Mod: GP | Performed by: PHYSICAL THERAPIST

## 2019-03-15 ENCOUNTER — HOSPITAL ENCOUNTER (OUTPATIENT)
Dept: PHYSICAL THERAPY | Facility: CLINIC | Age: 34
Setting detail: THERAPIES SERIES
End: 2019-03-15
Attending: PSYCHIATRY & NEUROLOGY
Payer: OTHER MISCELLANEOUS

## 2019-03-15 PROCEDURE — 97110 THERAPEUTIC EXERCISES: CPT | Mod: GP | Performed by: PHYSICAL THERAPIST

## 2019-03-15 PROCEDURE — 97116 GAIT TRAINING THERAPY: CPT | Mod: GP | Performed by: PHYSICAL THERAPIST

## 2019-03-18 ENCOUNTER — HOSPITAL ENCOUNTER (OUTPATIENT)
Dept: PHYSICAL THERAPY | Facility: CLINIC | Age: 34
Setting detail: THERAPIES SERIES
End: 2019-03-18
Attending: PSYCHIATRY & NEUROLOGY
Payer: OTHER MISCELLANEOUS

## 2019-03-18 PROCEDURE — 97110 THERAPEUTIC EXERCISES: CPT | Mod: GP | Performed by: PHYSICAL THERAPIST

## 2019-03-18 PROCEDURE — 97116 GAIT TRAINING THERAPY: CPT | Mod: GP | Performed by: PHYSICAL THERAPIST

## 2019-03-18 PROCEDURE — 97112 NEUROMUSCULAR REEDUCATION: CPT | Mod: GP | Performed by: PHYSICAL THERAPIST

## 2019-03-20 ENCOUNTER — TRANSFERRED RECORDS (OUTPATIENT)
Dept: HEALTH INFORMATION MANAGEMENT | Facility: CLINIC | Age: 34
End: 2019-03-20

## 2019-03-22 ENCOUNTER — HOSPITAL ENCOUNTER (OUTPATIENT)
Dept: PHYSICAL THERAPY | Facility: CLINIC | Age: 34
Setting detail: THERAPIES SERIES
End: 2019-03-22
Attending: PSYCHIATRY & NEUROLOGY
Payer: OTHER MISCELLANEOUS

## 2019-03-22 PROCEDURE — 97112 NEUROMUSCULAR REEDUCATION: CPT | Mod: GP | Performed by: PHYSICAL THERAPIST

## 2019-03-22 PROCEDURE — 97110 THERAPEUTIC EXERCISES: CPT | Mod: GP | Performed by: PHYSICAL THERAPIST

## 2019-03-25 NOTE — ADDENDUM NOTE
Encounter addended by: Adela Cordova, PT on: 3/24/2019 10:10 PM   Actions taken: Flowsheet data copied forward, Flowsheet accepted

## 2019-04-03 ENCOUNTER — HOSPITAL ENCOUNTER (OUTPATIENT)
Dept: PHYSICAL THERAPY | Facility: CLINIC | Age: 34
Setting detail: THERAPIES SERIES
End: 2019-04-03
Attending: PSYCHIATRY & NEUROLOGY
Payer: OTHER MISCELLANEOUS

## 2019-04-03 PROCEDURE — 97110 THERAPEUTIC EXERCISES: CPT | Mod: GP | Performed by: PHYSICAL THERAPIST

## 2019-04-08 ENCOUNTER — HOSPITAL ENCOUNTER (OUTPATIENT)
Dept: PHYSICAL THERAPY | Facility: CLINIC | Age: 34
Setting detail: THERAPIES SERIES
End: 2019-04-08
Attending: PSYCHIATRY & NEUROLOGY
Payer: OTHER MISCELLANEOUS

## 2019-04-08 PROCEDURE — 97110 THERAPEUTIC EXERCISES: CPT | Mod: GP | Performed by: PHYSICAL THERAPIST

## 2019-04-15 ENCOUNTER — HOSPITAL ENCOUNTER (OUTPATIENT)
Dept: PHYSICAL THERAPY | Facility: CLINIC | Age: 34
Setting detail: THERAPIES SERIES
End: 2019-04-15
Attending: PSYCHIATRY & NEUROLOGY
Payer: OTHER MISCELLANEOUS

## 2019-04-15 PROCEDURE — 97110 THERAPEUTIC EXERCISES: CPT | Mod: GP | Performed by: PHYSICAL THERAPIST

## 2019-04-22 ENCOUNTER — HOSPITAL ENCOUNTER (OUTPATIENT)
Dept: PHYSICAL THERAPY | Facility: CLINIC | Age: 34
Setting detail: THERAPIES SERIES
End: 2019-04-22
Attending: PSYCHIATRY & NEUROLOGY
Payer: OTHER MISCELLANEOUS

## 2019-04-22 PROCEDURE — 97112 NEUROMUSCULAR REEDUCATION: CPT | Mod: GP | Performed by: PHYSICAL THERAPIST

## 2019-04-22 PROCEDURE — 97110 THERAPEUTIC EXERCISES: CPT | Mod: GP | Performed by: PHYSICAL THERAPIST

## 2019-04-22 NOTE — PROGRESS NOTES
"Outpatient Physical Therapy Progress Note     Patient: Dominick Ross  : 1985    Beginning/End Dates of Reporting Period:  19 to 2019    Referring Provider: Dr. Damien Melendez    Therapy Diagnosis: UE, LE and core weakness relative to baseline , pain,  decreased endurance, decraeased ROM impaired balance and propriocetion S/P crush injury with incomplete SCI.     Client Self Report: Pt reports that he is doing well.     Objective Measurements:         - Able to walk x 10 mins with no AFO and no TLSO with SPC with mild increase in back pain and fatigue and \" has to think about\" clearing his L foot.     - Able to DF AG in sitting but uses great toe extn and not able to alan or extend 5th toe as consistently. Have been working intermittently with estim for this.     - Scapular control on R is improving but still poor.  Scapular winging is noted with shoulder elevation above 90 degrees with out TLSO.       - Does endorse some back pain but at this point it appears fairly well controlled.      Goals:  Goal Identifier Stairs   Goal Description Pt able to negotiate 1 flight of stairs with 1 rail safely reciprocally with out undue fatigue   Target Date 19   Date Met      Progress: Much improved. Goal 95% met     Goal Identifier Sleep   Goal Description Able to sleep for 8 hours with out being wakened by pain consistently.   Target Date 19   Date Met  19   Progress:     Goal Identifier Pain   Goal Description Pain no more than a 3/10 and no need for narcotic pain meds during the day.   .   Target Date 19   Date Met      Progress: Doing well with this.      Goal Identifier Driving   Goal Description Pt able to drive safely with good control in his legs   Target Date 19   Date Met  19   Progress:         Progress Toward Goals:   Progress this reporting period: Very good!    Plan:  Continue therapy per current plan of care.    Discharge:  No  "

## 2019-04-29 ENCOUNTER — HOSPITAL ENCOUNTER (OUTPATIENT)
Dept: PHYSICAL THERAPY | Facility: CLINIC | Age: 34
Setting detail: THERAPIES SERIES
End: 2019-04-29
Attending: PSYCHIATRY & NEUROLOGY
Payer: OTHER MISCELLANEOUS

## 2019-04-29 PROCEDURE — 97112 NEUROMUSCULAR REEDUCATION: CPT | Mod: GP | Performed by: PHYSICAL THERAPIST

## 2019-04-29 PROCEDURE — 97110 THERAPEUTIC EXERCISES: CPT | Mod: GP | Performed by: PHYSICAL THERAPIST

## 2019-05-06 ENCOUNTER — HOSPITAL ENCOUNTER (OUTPATIENT)
Dept: PHYSICAL THERAPY | Facility: CLINIC | Age: 34
Setting detail: THERAPIES SERIES
End: 2019-05-06
Attending: PSYCHIATRY & NEUROLOGY
Payer: OTHER MISCELLANEOUS

## 2019-05-06 PROCEDURE — 97112 NEUROMUSCULAR REEDUCATION: CPT | Mod: GP | Performed by: PHYSICAL THERAPIST

## 2019-05-06 PROCEDURE — 97110 THERAPEUTIC EXERCISES: CPT | Mod: GP | Performed by: PHYSICAL THERAPIST

## 2019-05-06 NOTE — PROGRESS NOTES
Outpatient Physical Therapy Progress Note     Patient: Dominick Ross  : 1985    Beginning/End Dates of Reporting Period:  19 to 2019    Referring Provider: Dr. Damien Melendez      Therapy Diagnosis: UE, LE and core weakness relative to baseline , pain,  decreased endurance, decraeased ROM impaired balance and propriocetion S/P crush injury with incomplete SCI.         Client Self Report: Pt reports that he is doing well a little pain in the middle back.  He has been walking a lot.     Objective Measurements:     Pain:  Rates pain at 5/10 when he is changing positions and 3/10 most of the time and it is in his thoracic back.      Strength: Nice improvements.     Able to walk for 5 mins with no brace and no AFO and no SPC with no stumbles and minimal gait deviation.  He does not note increased pain from this.       Ankle strength on L is improving but slow. WIll start working with a band next week.  NMES is only somewhat helpful. Working more with active motion.      Balance and coordination: Able to perform SLS R and tap cones with only minimal instability. L side is much harder. Requires cues to engage gluteals and abdominals to stabilize tunk position on top of leg.       Outcome Measures (most recent score):  Oswestry Score (%): 40 %    Goals:  Goal Identifier Stairs   Goal Description Pt able to negotiate 1 flight of stairs with 1 rail safely reciprocally with out undue fatigue   Target Date 19   Date Met  (Much improved)   Progress:     Goal Identifier Sleep   Goal Description Able to sleep for 8 hours with out being wakened by pain consistently.   Target Date 19   Date Met  19   Progress:     Goal Identifier Pain   Goal Description Pain no more than a 3/10 and no need for narcotic pain meds during the day.   .   Target Date 19   Date Met  (Notes that he has pain in his backat about a 5/10 at worst)   Progress:     Goal Identifier Driving   Goal Description Pt able  to drive safely with good control in his legs   Target Date 04/14/19   Date Met  03/18/19   Progress:         Progress Toward Goals:   Progress this reporting period: Good    Plan:  Continue therapy per current plan of care.    Discharge:  No

## 2019-05-13 ENCOUNTER — HOSPITAL ENCOUNTER (OUTPATIENT)
Dept: PHYSICAL THERAPY | Facility: CLINIC | Age: 34
Setting detail: THERAPIES SERIES
End: 2019-05-13
Attending: PSYCHIATRY & NEUROLOGY
Payer: OTHER MISCELLANEOUS

## 2019-05-13 PROCEDURE — 97112 NEUROMUSCULAR REEDUCATION: CPT | Mod: GP | Performed by: PHYSICAL THERAPIST

## 2019-05-13 PROCEDURE — 97110 THERAPEUTIC EXERCISES: CPT | Mod: GP | Performed by: PHYSICAL THERAPIST

## 2019-05-20 ENCOUNTER — HOSPITAL ENCOUNTER (OUTPATIENT)
Dept: PHYSICAL THERAPY | Facility: CLINIC | Age: 34
Setting detail: THERAPIES SERIES
End: 2019-05-20
Attending: PSYCHIATRY & NEUROLOGY
Payer: OTHER MISCELLANEOUS

## 2019-05-20 PROCEDURE — 97140 MANUAL THERAPY 1/> REGIONS: CPT | Mod: GP | Performed by: PHYSICAL THERAPIST

## 2019-05-20 PROCEDURE — 97110 THERAPEUTIC EXERCISES: CPT | Mod: GP | Performed by: PHYSICAL THERAPIST

## 2019-05-20 PROCEDURE — 97112 NEUROMUSCULAR REEDUCATION: CPT | Mod: GP | Performed by: PHYSICAL THERAPIST

## 2019-05-28 ENCOUNTER — HOSPITAL ENCOUNTER (OUTPATIENT)
Dept: PHYSICAL THERAPY | Facility: CLINIC | Age: 34
Setting detail: THERAPIES SERIES
End: 2019-05-28
Attending: PSYCHIATRY & NEUROLOGY
Payer: OTHER MISCELLANEOUS

## 2019-05-28 PROCEDURE — 97140 MANUAL THERAPY 1/> REGIONS: CPT | Mod: GP | Performed by: PHYSICAL THERAPIST

## 2019-05-28 PROCEDURE — 97110 THERAPEUTIC EXERCISES: CPT | Mod: GP | Performed by: PHYSICAL THERAPIST

## 2019-05-28 PROCEDURE — 97112 NEUROMUSCULAR REEDUCATION: CPT | Mod: GP | Performed by: PHYSICAL THERAPIST

## 2019-06-04 ENCOUNTER — HOSPITAL ENCOUNTER (OUTPATIENT)
Dept: PHYSICAL THERAPY | Facility: CLINIC | Age: 34
Setting detail: THERAPIES SERIES
End: 2019-06-04
Attending: PSYCHIATRY & NEUROLOGY
Payer: OTHER MISCELLANEOUS

## 2019-06-04 PROCEDURE — 97110 THERAPEUTIC EXERCISES: CPT | Mod: GP | Performed by: PHYSICAL THERAPIST

## 2019-06-04 PROCEDURE — 97112 NEUROMUSCULAR REEDUCATION: CPT | Mod: GP | Performed by: PHYSICAL THERAPIST

## 2019-06-17 ENCOUNTER — HOSPITAL ENCOUNTER (OUTPATIENT)
Dept: PHYSICAL THERAPY | Facility: CLINIC | Age: 34
Setting detail: THERAPIES SERIES
End: 2019-06-17
Attending: PSYCHIATRY & NEUROLOGY
Payer: OTHER MISCELLANEOUS

## 2019-06-17 PROCEDURE — 97110 THERAPEUTIC EXERCISES: CPT | Mod: GP | Performed by: PHYSICAL THERAPIST

## 2019-06-17 PROCEDURE — 97112 NEUROMUSCULAR REEDUCATION: CPT | Mod: GP | Performed by: PHYSICAL THERAPIST

## 2019-06-17 NOTE — ADDENDUM NOTE
Encounter addended by: Millie Manriquez, PT on: 6/17/2019 10:44 AM   Actions taken: Flowsheet accepted

## 2019-06-17 NOTE — PROGRESS NOTES
Outpatient Physical Therapy Progress Note     Patient: Dominick Ross  : 1985    Beginning/End Dates of Reporting Period:  19  to 2019    Referring Provider: Dr. Damien Melendez        Therapy Diagnosis: UE, LE and core weakness relative to baseline , pain,  decreased endurance, decraeased ROM impaired balance and propriocetion S/P crush injury with incomplete SCI.         Client Self Report: Did a class all last week. Back was sore during (a couple hours in) and after.  Pain meds (robxin tylenol and IBP) helped. Strength is coming around. working on R UE staibility for welding. Depth perception feels off lately.     Objective Measurements:  Objective Measure: Special tests  Details: negative cover-uncover, neg convergence      Improved scapular control on R.  Improved strength and control with R UE elevation. Still not equal to L.  Still compensatory patterns noted with R pectorals.       Back pain is in L Low back over iliac crest.       Goals:  Goal Identifier Stairs   Goal Description Pt able to negotiate 1 flight of stairs with 1 rail safely reciprocally with out undue fatigue   Target Date 19   Date Met      Progress: Much better goal is 95% met     Goal Identifier Sleep   Goal Description Able to sleep for 8 hours with out being wakened by pain consistently.   Target Date 19   Date Met  19   Progress:     Goal Identifier Pain   Goal Description Pain no more than a 3/10 and no need for narcotic pain meds during the day.   .   Target Date 19   Date Met      Progress: Goal is 95% met.       Goal Identifier Driving   Goal Description Pt able to drive safely with good control in his legs   Target Date 19   Date Met  19   Progress:         Progress Toward Goals:   Progress this reporting period: Bony continues to work really hard.  He is faithful with his exercises and as a result demonstrates improved R scapular control and is now able to walk with out his  AFO or his cane.  Foot drop is much improved.     Plan:  Changes to therapy plan of care: decrease to 1x/2 weeks. Continue to work on balance and proprioception on L LE and strength and control of R scapula and UE.     Discharge:  No

## 2019-06-19 ENCOUNTER — TRANSFERRED RECORDS (OUTPATIENT)
Dept: HEALTH INFORMATION MANAGEMENT | Facility: CLINIC | Age: 34
End: 2019-06-19

## 2019-06-24 ENCOUNTER — HOSPITAL ENCOUNTER (OUTPATIENT)
Dept: PHYSICAL THERAPY | Facility: CLINIC | Age: 34
Setting detail: THERAPIES SERIES
End: 2019-06-24
Attending: PSYCHIATRY & NEUROLOGY
Payer: OTHER MISCELLANEOUS

## 2019-06-24 PROCEDURE — 97110 THERAPEUTIC EXERCISES: CPT | Mod: GP | Performed by: PHYSICAL THERAPIST

## 2019-06-24 PROCEDURE — 97530 THERAPEUTIC ACTIVITIES: CPT | Mod: GP | Performed by: PHYSICAL THERAPIST

## 2019-06-24 PROCEDURE — 97112 NEUROMUSCULAR REEDUCATION: CPT | Mod: GP | Performed by: PHYSICAL THERAPIST

## 2019-07-10 ENCOUNTER — HOSPITAL ENCOUNTER (OUTPATIENT)
Dept: PHYSICAL THERAPY | Facility: CLINIC | Age: 34
Setting detail: THERAPIES SERIES
End: 2019-07-10
Attending: PSYCHIATRY & NEUROLOGY
Payer: OTHER MISCELLANEOUS

## 2019-07-10 PROCEDURE — 97110 THERAPEUTIC EXERCISES: CPT | Mod: GP | Performed by: PHYSICAL THERAPIST

## 2019-07-24 ENCOUNTER — HOSPITAL ENCOUNTER (OUTPATIENT)
Dept: PHYSICAL THERAPY | Facility: CLINIC | Age: 34
Setting detail: THERAPIES SERIES
End: 2019-07-24
Attending: PSYCHIATRY & NEUROLOGY
Payer: OTHER MISCELLANEOUS

## 2019-07-24 PROCEDURE — 97112 NEUROMUSCULAR REEDUCATION: CPT | Mod: GP | Performed by: PHYSICAL THERAPIST

## 2019-07-24 PROCEDURE — 97110 THERAPEUTIC EXERCISES: CPT | Mod: GP | Performed by: PHYSICAL THERAPIST

## 2019-08-07 ENCOUNTER — HOSPITAL ENCOUNTER (OUTPATIENT)
Dept: PHYSICAL THERAPY | Facility: CLINIC | Age: 34
Setting detail: THERAPIES SERIES
End: 2019-08-07
Attending: PSYCHIATRY & NEUROLOGY
Payer: OTHER MISCELLANEOUS

## 2019-08-07 PROCEDURE — 97113 AQUATIC THERAPY/EXERCISES: CPT | Mod: GP | Performed by: PHYSICAL THERAPIST

## 2019-08-21 ENCOUNTER — HOSPITAL ENCOUNTER (OUTPATIENT)
Dept: PHYSICAL THERAPY | Facility: CLINIC | Age: 34
Setting detail: THERAPIES SERIES
End: 2019-08-21
Attending: PSYCHIATRY & NEUROLOGY
Payer: OTHER MISCELLANEOUS

## 2019-08-21 PROCEDURE — 97113 AQUATIC THERAPY/EXERCISES: CPT | Mod: GP | Performed by: PHYSICAL THERAPIST

## 2019-08-27 NOTE — PROGRESS NOTES
"Outpatient Physical Therapy Progress Note     Patient: Dominick Ross  : 1985    Beginning/End Dates of Reporting Period:  19 to 2019    Referring Provider: Dr. Damien Melendez    Therapy Diagnosis: UE, LE and core weakness relative to baseline , pain,  decreased endurance, decraeased ROM impaired balance and propriocetion S/P crush injury with incomplete SCI     Client Self Report: L thoracic back is more cranky lately.  had been doing lawn and NSCing  projects,  Per Dr Mclaughlin instructions.  pool homework x 2-3 session, Uses AFO in community if he has to walk long distance or unknown environment.     Objective Measurements:  Objective Measure: Back pain   Details: start of session 3/10, \"moderate.\"  End of session 5/10 back pain, fatigued.  Objective Measure: R shoulder  Details: R scapula elevates when trying to hold R hand position during squats. still functionally weak scapular stabilization. flxn, horiz abduction and abduction x 10 ea with open paddles          Goals:  Goal Identifier Stairs   Goal Description Pt able to negotiate 1 flight of stairs with 1 rail safely reciprocally with out undue fatigue   Target Date 19   Date Met  19   Progress:     Goal Identifier Sleep   Goal Description Able to sleep for 8 hours with out being wakened by pain consistently.   Target Date 19   Date Met  19   Progress:     Goal Identifier Pain   Goal Description Pain no more than a 3/10 and no need for narcotic pain meds during the day.   .   Target Date 19   Date Met  (gets to 4-5/10 during the day and is using mm relaxant )   Progress:     Goal Identifier Driving   Goal Description Pt able to drive safely with good control in his legs   Target Date 19   Date Met  19   Progress:         Progress Toward Goals:   Progress this reporting period: Moving forward nicely. Slower but steady progress.     Plan:  Continue therapy per current plan of " care.    Discharge:  No

## 2019-09-10 ENCOUNTER — HOSPITAL ENCOUNTER (OUTPATIENT)
Dept: PHYSICAL THERAPY | Facility: CLINIC | Age: 34
Setting detail: THERAPIES SERIES
End: 2019-09-10
Attending: PSYCHIATRY & NEUROLOGY
Payer: OTHER MISCELLANEOUS

## 2019-09-10 PROCEDURE — 97112 NEUROMUSCULAR REEDUCATION: CPT | Mod: GP | Performed by: PHYSICAL THERAPIST

## 2019-09-10 PROCEDURE — 97110 THERAPEUTIC EXERCISES: CPT | Mod: GP | Performed by: PHYSICAL THERAPIST

## 2019-09-24 ENCOUNTER — HOSPITAL ENCOUNTER (OUTPATIENT)
Dept: PHYSICAL THERAPY | Facility: CLINIC | Age: 34
Setting detail: THERAPIES SERIES
End: 2019-09-24
Attending: PSYCHIATRY & NEUROLOGY
Payer: OTHER MISCELLANEOUS

## 2019-09-24 PROCEDURE — 97112 NEUROMUSCULAR REEDUCATION: CPT | Mod: GP | Performed by: PHYSICAL THERAPIST

## 2019-09-24 PROCEDURE — 97110 THERAPEUTIC EXERCISES: CPT | Mod: GP | Performed by: PHYSICAL THERAPIST

## 2019-09-24 NOTE — PROGRESS NOTES
Outpatient Physical Therapy Progress Note     Patient: Dominick Ross  : 1985    Beginning/End Dates of Reporting Period:  19 to 2019    Referring Provider: Dr. Damien Melendez       Therapy Diagnosis: UE, LE and core weakness relative to baseline , pain,  decreased endurance, decraeased ROM impaired balance and propriocetion S/P crush injury with incomplete SCI     Client Self Report: Pt reports he is stiff today (about a 5/10). States that the biggest problems are - holding his R UE abducted and  back pain. Current restrictions (let my body be my guide) Is lifting daughter who is 35 lbs. Would not want to do much more than that.  Does have pain with it.     Objective Measurements:  Objective Measure: Symptoms  Details: 510 today so far.  central back  Objective Measure: Gait  Details: Still increased sway to L side and slightly asymmetrical gait.   Objective Measure: R UE strength  Details: L UE 5/5 flxn and abduction, R flxn 4/5 R abd 4-/5   still not able to keep scapula in proper position.       Outcome Measures (most recent score):  Jose STarT Sub-Score (Q5-9): 3  Jose STarT Total Score (all 9): 6  Oswestry Score (%): 36 %    Goals:  Goal Identifier Stairs   Goal Description Pt able to negotiate 1 flight of stairs with 1 rail safely reciprocally with out undue fatigue   Target Date 19   Date Met  19   Progress:     Goal Identifier Sleep   Goal Description Able to sleep for 8 hours with out being wakened by pain consistently.   Target Date 19   Date Met  19   Progress:     Goal Identifier Pain   Goal Description Pain no more than a 3/10 and no need for narcotic pain meds during the day.   .   Target Date 19   Date Met  (Pain ranges between 3 and 6  and averages 4-5)   Progress: extended     Goal Identifier Driving   Goal Description Pt able to drive safely with good control in his legs   Target Date 19   Date Met  19(Multiple hours becomes more  difficult)   Progress:         Progress Toward Goals:   Progress this reporting period: Good. Pt has made some progress. As noted above, he still has a functionally weak R scapula and arm making sustained positions difficult.   He is ready to begin more work related tasks and these should be graded per his tolerance so as not to increase his back pain.  Would like to get him lifting 30-40 lbs with no increase in his back pain above 3/10 with no need for narcotics.  He is absolutely ready to begin part time work light duty with the ability to shift position as he needs to.     Plan:  Changes to therapy plan of care: Continue to see Bony 1-2x oer  Month to keep working on shoulder progress and to work on core strength and manage back pain.    Discharge:  No

## 2019-10-01 ENCOUNTER — TRANSFERRED RECORDS (OUTPATIENT)
Dept: HEALTH INFORMATION MANAGEMENT | Facility: CLINIC | Age: 34
End: 2019-10-01

## 2019-10-08 ENCOUNTER — HOSPITAL ENCOUNTER (OUTPATIENT)
Dept: PHYSICAL THERAPY | Facility: CLINIC | Age: 34
Setting detail: THERAPIES SERIES
End: 2019-10-08
Attending: PSYCHIATRY & NEUROLOGY
Payer: OTHER MISCELLANEOUS

## 2019-10-08 PROCEDURE — 97113 AQUATIC THERAPY/EXERCISES: CPT | Mod: GP | Performed by: PHYSICAL THERAPIST

## 2019-10-22 ENCOUNTER — HOSPITAL ENCOUNTER (OUTPATIENT)
Dept: PHYSICAL THERAPY | Facility: CLINIC | Age: 34
Setting detail: THERAPIES SERIES
End: 2019-10-22
Attending: PSYCHIATRY & NEUROLOGY
Payer: OTHER MISCELLANEOUS

## 2019-10-22 PROCEDURE — 97110 THERAPEUTIC EXERCISES: CPT | Mod: GP | Performed by: PHYSICAL THERAPIST

## 2019-10-22 NOTE — PROGRESS NOTES
Outpatient Physical Therapy Progress Note     Patient: Dominick Ross  : 1985    Beginning/End Dates of Reporting Period:  2019 to 10/22/2019    Referring Provider: Dr. Damien Melendez    Therapy Diagnosis: UE, LE and core weakness relative to baseline , pain,  decreased endurance, decraeased ROM impaired balance and propriocetion S/P crush injury with incomplete SCI     Client Self Report: Pt reports he is doing well.  He states that work hardening is going well. Scapula is still winging. L Leg thigh was really painful.  Back is sore- last week at about 9/10  Doctor is aware.  This morning is 3-4/10 and not down the leg.  did not work yesterday.  Prolonged stanidng still painful.         Goals:  Goal Identifier Stairs   Goal Description Pt able to negotiate 1 flight of stairs with 1 rail safely reciprocally with out undue fatigue   Target Date 19   Date Met  19   Progress:     Goal Identifier Sleep   Goal Description Able to sleep for 8 hours with out being wakened by pain consistently.   Target Date 19   Date Met  19   Progress: Work flares it up some but over all doing well.     Goal Identifier Pain   Goal Description Pain no more than a 3/10 and no need for narcotic pain meds during the day.   .   Target Date 19   Date Met      Progress:     Goal Identifier Driving   Goal Description Pt able to drive safely with good control in his legs   Target Date 19   Date Met  19   Progress:         Progress Toward Goals:   Progress this reporting period: Doing very well. Enjoying being back at work but does get more sore, sometimes up to a 9/10.    Plan:  Continue therapy per current plan of care.    Discharge:  NO    Addendum 2/10.20:  Per phone conversation with Pt. Pt was undergoing work hardening and broke his fusion rods and did not fuse.  Underwent revision surgery 20 and is now back in the back brace. Goes back to MD 20.  Will discharge this  episode of care per this note.

## 2020-01-03 ENCOUNTER — TRANSFERRED RECORDS (OUTPATIENT)
Dept: HEALTH INFORMATION MANAGEMENT | Facility: CLINIC | Age: 35
End: 2020-01-03

## 2020-01-03 ENCOUNTER — TELEPHONE (OUTPATIENT)
Dept: FAMILY MEDICINE | Facility: CLINIC | Age: 35
End: 2020-01-03

## 2020-01-03 NOTE — TELEPHONE ENCOUNTER
Reason for Call:  Other call back    Detailed comments: Patient states he needs his handicap renewal done.     Phone Number Patient can be reached at: Cell number on file:    Telephone Information:   Mobile [phone]       Best Time: any     Can we leave a detailed message on this number? YES    Call taken on 1/3/2020 at 2:24 PM by Laila Mckeon

## 2020-01-03 NOTE — TELEPHONE ENCOUNTER
The last one that was done was temporary, 1-6 months due to traumatic spine injury 1/16/19.    Last office visit with Dr. Rosas was 01/31/2019. Please advise. No future appointment scheduled.    Val Brown on 1/3/2020 at 2:35 PM

## 2020-01-09 ENCOUNTER — TELEPHONE (OUTPATIENT)
Dept: FAMILY MEDICINE | Facility: CLINIC | Age: 35
End: 2020-01-09

## 2020-01-09 NOTE — TELEPHONE ENCOUNTER
Patent can be seen at 2:40 PM on 1/13/2020 per Dr. Rosas. Patient was called and notified. Patient was also instructed to bring all necessary preop forms with him or to call the facility and provide a list of any labs that he should need for this visit. Patient will comply.   Val Brown on 1/9/2020 at 2:25 PM

## 2020-01-09 NOTE — TELEPHONE ENCOUNTER
Reason for Call:  Same Day Appointment, Requested Provider:  Remy Rosas MD    PCP: Remy Rosas    Reason for visit: Pre op - Neurosurgeons would like to do his surgery asap next week, just waiting to see when he can see Dr Rosas for his pre-op    Duration of symptoms:     Have you been treated for this in the past? Yes    Additional comments: Would like to see Dr Rosas Mon or Weds next week    Can we leave a detailed message on this number? YES    Phone number patient can be reached at: Home number on file [phone] (home)    Best Time: any    Call taken on 1/9/2020 at 11:10 AM by Dory Mercedes

## 2020-01-13 ENCOUNTER — OFFICE VISIT (OUTPATIENT)
Dept: FAMILY MEDICINE | Facility: CLINIC | Age: 35
End: 2020-01-13
Payer: OTHER MISCELLANEOUS

## 2020-01-13 VITALS
HEART RATE: 69 BPM | BODY MASS INDEX: 30.3 KG/M2 | RESPIRATION RATE: 16 BRPM | TEMPERATURE: 97.1 F | DIASTOLIC BLOOD PRESSURE: 80 MMHG | OXYGEN SATURATION: 96 % | SYSTOLIC BLOOD PRESSURE: 116 MMHG | WEIGHT: 220.3 LBS

## 2020-01-13 DIAGNOSIS — S22.080S: ICD-10-CM

## 2020-01-13 DIAGNOSIS — Z01.818 PREOP GENERAL PHYSICAL EXAM: Primary | ICD-10-CM

## 2020-01-13 PROCEDURE — 99214 OFFICE O/P EST MOD 30 MIN: CPT | Performed by: FAMILY MEDICINE

## 2020-01-13 NOTE — PROGRESS NOTES
[address] [address]  [phone]  Dept: [phone]    PRE-OP EVALUATION:  Today's date: 2020    Dominick Ross (: 1985) presents for pre-operative evaluation assessment as requested by Dr. Velasquez.  He requires evaluation and anesthesia risk assessment prior to undergoing surgery/procedure for treatment of  OPEN REDUCTION INTERNAL FIXATION T12 BURST FRACTURE WITH T10-L2 INSTRUMENTED POSTEROLATERAL FUSION POSSIBLE EXTENSION TO T9 AND L3 USING OPTICAL TRACKING SYSTEM .    Fax number for surgical facility: [phone]   Primary Physician: Remy Rosas  Type of Anesthesia Anticipated: General    Patient has a Health Care Directive or Living Will:  NO    Preop Questions 2020   Who is doing your surgery? shyla ham   What are you having done? back surgery   Date of Surgery/Procedure: 2020   Facility or Hospital where procedure/surgery will be performed: [facility]   1.  Do you have a history of Heart attack, stroke, stent, coronary bypass surgery, or other heart surgery? No   2.  Do you ever have any pain or discomfort in your chest? No   3.  Do you have a history of  Heart Failure? No   4.   Are you troubled by shortness of breath when:  walking on a level surface, or up a slight hill, or at night? YES - due to back    5.  Do you currently have a cold, bronchitis or other respiratory infection? No   6.  Do you have a cough, shortness of breath, or wheezing? No   7.  Do you sometimes get pains in the calves of your legs when you walk? YES - due to back    8. Do you or anyone in your family have previous history of blood clots? No   9.  Do you or does anyone in your family have a serious bleeding problem such as prolonged bleeding following surgeries or cuts? No   10. Have you ever had problems with anemia or been told to take iron pills? No   11. Have you had any abnormal blood loss such as black, tarry or bloody stools? No    12. Have you ever had a blood transfusion? No   13. Have you or any of your relatives ever had problems with anesthesia? No   14. Do you have sleep apnea, excessive snoring or daytime drowsiness? YES - snoring and daytime drowsiness.    15. Do you have any prosthetic heart valves? No   16. Do you have prosthetic joints? No         HPI:     HPI related to upcoming procedure: Previous back injury and surgery over a year ago with internal fixation.  Sudden snap in his back and the rods broke back in December.          MEDICAL HISTORY:     Patient Active Problem List    Diagnosis Date Noted     Adjustment disorder with mixed anxiety and depressed mood 02/14/2019     Priority: Medium     Traumatic compression fracture of T12 thoracic vertebra, sequela 01/20/2019     Priority: Medium     Closed fracture of one rib of right side with routine healing, subsequent encounter 01/20/2019     Priority: Medium     PTSD (post-traumatic stress disorder) 01/20/2019     Priority: Medium     Closed fracture of spinous process of thoracic vertebra with routine healing, subsequent encounter 01/20/2019     Priority: Medium     CARDIOVASCULAR SCREENING; LDL GOAL LESS THAN 160 05/15/2012     Priority: Medium      Past Medical History:   Diagnosis Date     NO ACTIVE PROBLEMS      Past Surgical History:   Procedure Laterality Date     ENDOSCOPY      fall 2014     ESOPHAGOSCOPY, GASTROSCOPY, DUODENOSCOPY (EGD), COMBINED N/A 12/3/2014    Procedure: COMBINED ESOPHAGOSCOPY, GASTROSCOPY, DUODENOSCOPY (EGD), BIOPSY SINGLE OR MULTIPLE;  Surgeon: Maximiliano King MD;  Location:  GI     SPERMATOCELECTOMY BILATERAL Bilateral 9/3/2015    Procedure: SPERMATOCELECTOMY BILATERAL;  Surgeon: Ko Parker MD;  Location:  OR     Current Outpatient Medications   Medication Sig Dispense Refill     gabapentin (NEURONTIN) 300 MG capsule Take 1 capsule (300 mg) by mouth 2 times daily 60 capsule 1     methocarbamol (ROBAXIN) 500 MG tablet Take 500  mg by mouth 4 times daily       traZODone (DESYREL) 50 MG tablet Take 50 mg by mouth At Bedtime       acetaminophen (TYLENOL) 500 MG tablet Take 500 mg by mouth 2 times daily       ibuprofen (ADVIL/MOTRIN) 400 MG tablet Take 400 mg by mouth every 4 hours as needed for moderate pain       oxyCODONE (ROXICODONE) 5 MG/5ML solution Take 5 mg by mouth every 4 hours as needed for severe pain       polyethylene glycol (MIRALAX/GLYCOLAX) packet Take 1 packet by mouth daily       senna-docusate (SENOKOT-S/PERICOLACE) 8.6-50 MG tablet Take 1-2 tablets by mouth 2 times daily as needed for constipation       OTC products: None, except as noted above    No Known Allergies   Latex Allergy: NO    Social History     Tobacco Use     Smoking status: Never Smoker     Smokeless tobacco: Current User     Types: Chew   Substance Use Topics     Alcohol use: Yes     History   Drug Use No       REVIEW OF SYSTEMS:   Constitutional, neuro, ENT, endocrine, pulmonary, cardiac, gastrointestinal, genitourinary, musculoskeletal, integument and psychiatric systems are negative, except as otherwise noted.    EXAM:   /80 (BP Location: Left arm, Patient Position: Sitting, Cuff Size: Adult Large)   Pulse 69   Temp 97.1  F (36.2  C) (Temporal)   Resp 16   Wt 99.9 kg (220 lb 4.8 oz)   SpO2 96%   BMI 30.30 kg/m      GENERAL APPEARANCE: healthy, alert and no distress     EYES: EOMI,  PERRL     HENT: ear canals and TM's normal and nose and mouth without ulcers or lesions     NECK: no adenopathy, no asymmetry, masses, or scars and thyroid normal to palpation     RESP: lungs clear to auscultation - no rales, rhonchi or wheezes     CV: regular rates and rhythm, normal S1 S2, no S3 or S4 and no murmur, click or rub     ABDOMEN:  soft, nontender, no HSM or masses and bowel sounds normal     MS: extremities normal- no gross deformities noted, no evidence of inflammation in joints, FROM in all extremities.     SKIN: no suspicious lesions or rashes      NEURO: Normal strength and tone, sensory exam grossly normal, mentation intact and speech normal     PSYCH: mentation appears normal. and affect normal/bright     LYMPHATICS: No cervical adenopathy    DIAGNOSTICS:   EKG: Not indicated due to non-vascular surgery and low risk of event (age <65 and without cardiac risk factors)    Recent Labs   Lab Test 08/06/15  1601   HGB 15.0        IMPRESSION:   Reason for surgery/procedure: Stabilization of spine    The proposed surgical procedure is considered INTERMEDIATE risk.    REVISED CARDIAC RISK INDEX  The patient has the following serious cardiovascular risks for perioperative complications such as (MI, PE, VFib and 3  AV Block):  No serious cardiac risks  INTERPRETATION: 0 risks: Class I (very low risk - 0.4% complication rate)    The patient has the following additional risks for perioperative complications:  No identified additional risks      ICD-10-CM    1. Preop general physical exam Z01.818        RECOMMENDATIONS:             APPROVAL GIVEN to proceed with proposed procedure, without further diagnostic evaluation       Signed Electronically by: Remy Rosas MD    Copy of this evaluation report is provided to requesting physician.    Good Preop Guidelines    Revised Cardiac Risk Index

## 2020-01-16 ENCOUNTER — TRANSFERRED RECORDS (OUTPATIENT)
Dept: HEALTH INFORMATION MANAGEMENT | Facility: CLINIC | Age: 35
End: 2020-01-16

## 2020-01-22 ENCOUNTER — ANCILLARY PROCEDURE (OUTPATIENT)
Dept: GENERAL RADIOLOGY | Facility: CLINIC | Age: 35
End: 2020-01-22
Attending: FAMILY MEDICINE
Payer: COMMERCIAL

## 2020-01-22 ENCOUNTER — OFFICE VISIT (OUTPATIENT)
Dept: FAMILY MEDICINE | Facility: CLINIC | Age: 35
End: 2020-01-22
Payer: COMMERCIAL

## 2020-01-22 ENCOUNTER — MYC MEDICAL ADVICE (OUTPATIENT)
Dept: FAMILY MEDICINE | Facility: CLINIC | Age: 35
End: 2020-01-22

## 2020-01-22 VITALS
RESPIRATION RATE: 20 BRPM | SYSTOLIC BLOOD PRESSURE: 124 MMHG | TEMPERATURE: 98.8 F | HEIGHT: 71 IN | DIASTOLIC BLOOD PRESSURE: 78 MMHG | HEART RATE: 104 BPM | OXYGEN SATURATION: 95 % | BODY MASS INDEX: 29.88 KG/M2 | WEIGHT: 213.4 LBS

## 2020-01-22 DIAGNOSIS — T74.91XA DOMESTIC VIOLENCE OF ADULT, INITIAL ENCOUNTER: ICD-10-CM

## 2020-01-22 DIAGNOSIS — T74.91XA DOMESTIC VIOLENCE OF ADULT, INITIAL ENCOUNTER: Primary | ICD-10-CM

## 2020-01-22 PROCEDURE — 72070 X-RAY EXAM THORAC SPINE 2VWS: CPT | Mod: FY

## 2020-01-22 PROCEDURE — 72100 X-RAY EXAM L-S SPINE 2/3 VWS: CPT | Mod: FY

## 2020-01-22 PROCEDURE — 99213 OFFICE O/P EST LOW 20 MIN: CPT | Performed by: FAMILY MEDICINE

## 2020-01-22 RX ORDER — HYDROXYZINE PAMOATE 50 MG/1
50 CAPSULE ORAL
COMMUNITY
Start: 2020-01-19

## 2020-01-22 ASSESSMENT — MIFFLIN-ST. JEOR: SCORE: 1925.11

## 2020-01-22 ASSESSMENT — PAIN SCALES - GENERAL: PAINLEVEL: MODERATE PAIN (4)

## 2020-01-22 NOTE — PATIENT INSTRUCTIONS
Important Takeaway Points From This Visit:    I have documented the injuries you have shown me.      As always, please call with any questions or concerns. I look forward to seeing you again soon!    Take care,  Dr. Matta    Your current medication list is printed. Please keep this with you - it is helpful to bring this current list to any other medical appointments. It can also be helpful if you ever go to the emergency room or hospital.    If you had lab testing today we will call you with the results. The phone number we will call with your results is # [phone] (home) [phone] (work). If this is not the best number please call our clinic and change the number.    If you need any refills, please call your pharmacy and they will contact us.    If you have any further concerns or wish to schedule another appointment, please call our office at [phone].    If you have a medical emergency, please call [phone].    Thank you for coming to [facility]!
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
## 2020-02-03 ENCOUNTER — TRANSFERRED RECORDS (OUTPATIENT)
Dept: HEALTH INFORMATION MANAGEMENT | Facility: CLINIC | Age: 35
End: 2020-02-03

## 2020-02-12 ENCOUNTER — TRANSFERRED RECORDS (OUTPATIENT)
Dept: HEALTH INFORMATION MANAGEMENT | Facility: CLINIC | Age: 35
End: 2020-02-12

## 2020-02-16 ENCOUNTER — HEALTH MAINTENANCE LETTER (OUTPATIENT)
Age: 35
End: 2020-02-16

## 2020-04-15 ENCOUNTER — TRANSFERRED RECORDS (OUTPATIENT)
Dept: HEALTH INFORMATION MANAGEMENT | Facility: CLINIC | Age: 35
End: 2020-04-15

## 2020-06-17 ENCOUNTER — TRANSFERRED RECORDS (OUTPATIENT)
Dept: HEALTH INFORMATION MANAGEMENT | Facility: CLINIC | Age: 35
End: 2020-06-17

## 2020-08-05 ENCOUNTER — TRANSFERRED RECORDS (OUTPATIENT)
Dept: HEALTH INFORMATION MANAGEMENT | Facility: CLINIC | Age: 35
End: 2020-08-05

## 2020-08-06 ENCOUNTER — TRANSFERRED RECORDS (OUTPATIENT)
Dept: HEALTH INFORMATION MANAGEMENT | Facility: CLINIC | Age: 35
End: 2020-08-06

## 2020-11-22 ENCOUNTER — HEALTH MAINTENANCE LETTER (OUTPATIENT)
Age: 35
End: 2020-11-22

## 2021-01-06 ENCOUNTER — TRANSFERRED RECORDS (OUTPATIENT)
Dept: HEALTH INFORMATION MANAGEMENT | Facility: CLINIC | Age: 36
End: 2021-01-06

## 2021-04-04 ENCOUNTER — HEALTH MAINTENANCE LETTER (OUTPATIENT)
Age: 36
End: 2021-04-04

## 2021-09-19 ENCOUNTER — HEALTH MAINTENANCE LETTER (OUTPATIENT)
Age: 36
End: 2021-09-19

## 2022-05-01 ENCOUNTER — HEALTH MAINTENANCE LETTER (OUTPATIENT)
Age: 37
End: 2022-05-01

## 2022-11-20 ENCOUNTER — HEALTH MAINTENANCE LETTER (OUTPATIENT)
Age: 37
End: 2022-11-20

## 2023-06-02 ENCOUNTER — HEALTH MAINTENANCE LETTER (OUTPATIENT)
Age: 38
End: 2023-06-02

## 2024-06-26 ENCOUNTER — TRANSFERRED RECORDS (OUTPATIENT)
Dept: HEALTH INFORMATION MANAGEMENT | Facility: CLINIC | Age: 39
End: 2024-06-26
Payer: COMMERCIAL